# Patient Record
Sex: FEMALE | Race: WHITE | NOT HISPANIC OR LATINO
[De-identification: names, ages, dates, MRNs, and addresses within clinical notes are randomized per-mention and may not be internally consistent; named-entity substitution may affect disease eponyms.]

---

## 2019-08-13 ENCOUNTER — FORM ENCOUNTER (OUTPATIENT)
Age: 59
End: 2019-08-13

## 2020-03-16 ENCOUNTER — FORM ENCOUNTER (OUTPATIENT)
Age: 60
End: 2020-03-16

## 2020-08-18 ENCOUNTER — FORM ENCOUNTER (OUTPATIENT)
Age: 60
End: 2020-08-18

## 2021-05-12 DIAGNOSIS — Z85.3 PERSONAL HISTORY OF MALIGNANT NEOPLASM OF BREAST: ICD-10-CM

## 2021-05-12 PROBLEM — Z00.00 ENCOUNTER FOR PREVENTIVE HEALTH EXAMINATION: Status: ACTIVE | Noted: 2021-05-12

## 2021-08-26 DIAGNOSIS — Z86.79 PERSONAL HISTORY OF OTHER DISEASES OF THE CIRCULATORY SYSTEM: ICD-10-CM

## 2021-08-26 DIAGNOSIS — Z78.9 OTHER SPECIFIED HEALTH STATUS: ICD-10-CM

## 2021-08-26 DIAGNOSIS — Z80.0 FAMILY HISTORY OF MALIGNANT NEOPLASM OF DIGESTIVE ORGANS: ICD-10-CM

## 2021-08-26 RX ORDER — AMLODIPINE BESYLATE 5 MG/1
5 TABLET ORAL
Refills: 0 | Status: ACTIVE | COMMUNITY

## 2021-08-26 RX ORDER — ANASTROZOLE TABLETS 1 MG/1
1 TABLET ORAL
Refills: 0 | Status: ACTIVE | COMMUNITY

## 2021-09-01 ENCOUNTER — APPOINTMENT (OUTPATIENT)
Dept: BREAST CENTER | Facility: CLINIC | Age: 61
End: 2021-09-01
Payer: COMMERCIAL

## 2021-09-01 VITALS — BODY MASS INDEX: 21.16 KG/M2 | WEIGHT: 115 LBS | HEIGHT: 62 IN

## 2021-09-01 DIAGNOSIS — Z90.12 ACQUIRED ABSENCE OF LEFT BREAST AND NIPPLE: ICD-10-CM

## 2021-09-01 DIAGNOSIS — Z72.89 OTHER PROBLEMS RELATED TO LIFESTYLE: ICD-10-CM

## 2021-09-01 PROCEDURE — 99214 OFFICE O/P EST MOD 30 MIN: CPT

## 2021-09-01 NOTE — REASON FOR VISIT
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 Biopsy showed a well differentiated invasive duct cancer which was ER positive, OR negative, and HER-2/kelli negative and MRI showed a localized cancer and she underwent a left breast partial mastectomy and sentinel lymph node biopsy with intraoperative radiation as part of the TARGIT US trial in September 2015. Final pathology showed a 7 mm cancer with no lymphovascular invasion and 3 negative nodes making this a pathologic prognostic stage IA breast cancer. She did not require external beam radiation and was placed on Arimidex and comes in for routine follow-up.

## 2021-09-01 NOTE — ASSESSMENT
[FreeTextEntry1] : The patient is a 61-year-old G2, P2 postmenopausal white female of [place] descent. She underwent menarche at age 9 and had her first child at age 31. She underwent menopause at age 35 secondary to a hysterectomy but she did keep 1 ovary. She never took any hormone replacement therapy. She had bilateral stereotactic biopsies in the past which were benign and has no family history of breast or ovarian cancer. She was sent found to have an abnormal screening mammography and ultrasound with distortion in the left breast 12:00 region in July 2015 and ultrasound showed a suspicious 6 mm hypoechoic mass. Ultrasound-guided core biopsy performed on August 5, 2015 showed a well differentiated invasive duct cancer which was ER positive at 90%, MT negative, and HER-2/kelli negative. MRI showed localized disease and she underwent a left breast partial mastectomy and sentinel lymph node biopsy with intraoperative radiation as part of the TARGIT US trial on September 8, 2015. Final pathology showed a 7 mm well differentiated invasive duct cancer with no lymphovascular invasion and free margins and she had 3 negative sentinel lymph nodes making this a pathologic prognostic stage IA breast cancer. She did not require any external beam radiation and she saw Dr. Blackwell in consultation and was just placed on Arimidex. She underwent her last mammography and ultrasound on August 25, 2021 at [place] which showed no suspicious findings.  Her next mammography and ultrasound will be due again in August 2022.  On exam today, I cannot feel any suspicious densities in either breast with no evidence of recurrence.  She should remain on Arimidex and I had like to see her again in 6 months in February 2022.  She will start getting her routine studies here at [facility].

## 2021-09-01 NOTE — PHYSICAL EXAM
[Normocephalic] : normocephalic [Atraumatic] : atraumatic [EOMI] : extra ocular movement intact [Supple] : supple [No Supraclavicular Adenopathy] : no supraclavicular adenopathy [No Cervical Adenopathy] : no cervical adenopathy [Examined in the supine and seated position] : examined in the supine and seated position [No dominant masses] : no dominant masses in right breast  [No dominant masses] : no dominant masses left breast [No Nipple Retraction] : no left nipple retraction [No Nipple Discharge] : no left nipple discharge [Breast Mass Right Breast ___cm] : no masses [Breast Mass Left Breast ___cm] : no masses [Breast Nipple Inversion] : nipples not inverted [Breast Nipple Retraction] : nipples not retracted [Breast Nipple Flattening] : nipples not flattened [Breast Nipple Fissures] : nipples not fissured [Breast Abnormal Lactation (Galactorrhea)] : no galactorrhea [Breast Abnormal Secretion Bloody Fluid] : no bloody discharge [Breast Abnormal Secretion Serous Fluid] : no serous discharge [Breast Abnormal Secretion Opalescent Fluid] : no milky discharge [No Axillary Lymphadenopathy] : no left axillary lymphadenopathy [No Edema] : no edema [No Rashes] : no rashes [No Ulceration] : no ulceration [de-identified] : On exam, the patient has A-cup breasts.  On palpation she has a well-healed left breast periareolar excision with no evidence of recurrence in the left breast which was some scarring changes from the intraoperative radiation.  She has no axillary, supraclavicular, or cervical adenopathy. [de-identified] : Status post partial mastectomy with no evidence of recurrence and typical scarring changes after intraoperative radiation.

## 2021-09-01 NOTE — PAST MEDICAL HISTORY
[Postmenopausal] : The patient is postmenopausal [Menarche Age ____] : age at menarche was [unfilled] [Menopause Age____] : age at menopause was [unfilled] [Total Preg ___] : G[unfilled] [Live Births ___] : P[unfilled]  [Age At Live Birth ___] : Age at live birth: [unfilled] [History of Hormone Replacement Treatment] : has no history of hormone replacement treatment [de-identified] : s/p MAX and unilat SO

## 2021-09-01 NOTE — HISTORY OF PRESENT ILLNESS
[FreeTextEntry1] : The patient is a 61-year-old G2, P2 postmenopausal white female of English descent. She underwent menarche at age 9 and had her first child at age 31. She underwent menopause at age 35 secondary to a hysterectomy but she did keep 1 ovary. She never took any hormone replacement therapy. She had bilateral stereotactic biopsies in the past which were benign and has no family history of breast or ovarian cancer. She was sent found to have an abnormal screening mammography and ultrasound with distortion in the left breast 12:00 region in July 2015 and ultrasound showed a suspicious 6 mm hypoechoic mass. Ultrasound-guided core biopsy performed on August 5, 2015 showed a well differentiated invasive duct cancer which was ER positive at 90%, [place] negative, and HER-2/kelli negative. MRI showed localized disease and she underwent a left breast partial mastectomy and sentinel lymph node biopsy with intraoperative radiation as part of the TARGIT US trial on September 8, 2015. Final pathology showed a 7 mm well differentiated invasive duct cancer with no lymphovascular invasion and free margins and she had 3 negative sentinel lymph nodes making this a pathologic prognostic stage IA breast cancer. She did not require any external beam radiation and she saw Dr. Blackwell in consultation and was just placed on Arimidex. She comes in for routine follow-up and gets yearly mammography and ultrasound.

## 2021-10-13 RX ORDER — ANASTROZOLE TABLETS 1 MG/1
1 TABLET ORAL DAILY
Qty: 90 | Refills: 3 | Status: ACTIVE | COMMUNITY
Start: 2021-10-13 | End: 1900-01-01

## 2022-03-30 ENCOUNTER — NON-APPOINTMENT (OUTPATIENT)
Age: 62
End: 2022-03-30

## 2022-04-20 ENCOUNTER — APPOINTMENT (OUTPATIENT)
Dept: BREAST CENTER | Facility: CLINIC | Age: 62
End: 2022-04-20
Payer: COMMERCIAL

## 2022-04-20 VITALS
WEIGHT: 118 LBS | HEART RATE: 61 BPM | BODY MASS INDEX: 21.71 KG/M2 | DIASTOLIC BLOOD PRESSURE: 76 MMHG | SYSTOLIC BLOOD PRESSURE: 142 MMHG | HEIGHT: 62 IN

## 2022-04-20 DIAGNOSIS — R92.2 INCONCLUSIVE MAMMOGRAM: ICD-10-CM

## 2022-04-20 PROCEDURE — 99213 OFFICE O/P EST LOW 20 MIN: CPT

## 2022-04-20 NOTE — HISTORY OF PRESENT ILLNESS
[FreeTextEntry1] : The patient is a 62-year-old G2, P2 postmenopausal white female of [place] descent. She underwent menarche at age 9 and had her first child at age 31. She underwent menopause at age 35 secondary to a hysterectomy but she did keep 1 ovary. She never took any hormone replacement therapy. She had bilateral stereotactic biopsies in the past which were benign and has no family history of breast or ovarian cancer. She was sent found to have an abnormal screening mammography and ultrasound with distortion in the left breast 12:00 region in July 2015 and ultrasound showed a suspicious 6 mm hypoechoic mass. Ultrasound-guided core biopsy performed on August 5, 2015 showed a well differentiated invasive duct cancer which was ER positive at 90%, RI negative, and HER-2/kelli negative. MRI showed localized disease and she underwent a left breast partial mastectomy and sentinel lymph node biopsy with intraoperative radiation as part of the TARGIT US trial on September 8, 2015. Final pathology showed a 7 mm well differentiated invasive duct cancer with no lymphovascular invasion and free margins and she had 3 negative sentinel lymph nodes making this a pathologic prognostic stage IA breast cancer. She did not require any external beam radiation and she saw Dr. Blackwell in consultation and was just placed on Arimidex. She comes in for routine follow-up and gets yearly mammography and ultrasound.

## 2022-04-20 NOTE — REASON FOR VISIT
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 Biopsy showed a well differentiated invasive duct cancer which was ER positive, [place] negative, and HER-2/kelli negative and MRI showed a localized cancer and she underwent a left breast partial mastectomy and sentinel lymph node biopsy with intraoperative radiation as part of the TARGIT US trial in September 2015. Final pathology showed a 7 mm cancer with no lymphovascular invasion and 3 negative nodes making this a pathologic prognostic stage IA breast cancer. She did not require external beam radiation and was placed on Arimidex and comes in for routine follow-up.

## 2022-04-20 NOTE — PHYSICAL EXAM
[Normocephalic] : normocephalic [Atraumatic] : atraumatic [EOMI] : extra ocular movement intact [Supple] : supple [No Supraclavicular Adenopathy] : no supraclavicular adenopathy [No Cervical Adenopathy] : no cervical adenopathy [Examined in the supine and seated position] : examined in the supine and seated position [No dominant masses] : no dominant masses in right breast  [No dominant masses] : no dominant masses left breast [No Nipple Retraction] : no left nipple retraction [No Nipple Discharge] : no left nipple discharge [Breast Mass Right Breast ___cm] : no masses [Breast Mass Left Breast ___cm] : no masses [No Axillary Lymphadenopathy] : no left axillary lymphadenopathy [No Edema] : no edema [No Rashes] : no rashes [No Ulceration] : no ulceration [Breast Nipple Inversion] : nipples not inverted [Breast Nipple Retraction] : nipples not retracted [Breast Nipple Flattening] : nipples not flattened [Breast Nipple Fissures] : nipples not fissured [Breast Abnormal Lactation (Galactorrhea)] : no galactorrhea [Breast Abnormal Secretion Bloody Fluid] : no bloody discharge [Breast Abnormal Secretion Serous Fluid] : no serous discharge [Breast Abnormal Secretion Opalescent Fluid] : no milky discharge [de-identified] : On exam, the patient has A-cup breasts.  On palpation she has a well-healed left breast periareolar excision with no evidence of recurrence in the left breast which was some scarring changes from the intraoperative radiation.  She has no axillary, supraclavicular, or cervical adenopathy. [de-identified] : Status post partial mastectomy with no evidence of recurrence and typical scarring changes after intraoperative radiation.

## 2022-04-20 NOTE — ASSESSMENT
[FreeTextEntry1] : The patient is a 62-year-old G2, P2 postmenopausal white female of [place] descent. She underwent menarche at age 9 and had her first child at age 31. She underwent menopause at age 35 secondary to a hysterectomy but she did keep 1 ovary. She never took any hormone replacement therapy. She had bilateral stereotactic biopsies in the past which were benign and has no family history of breast or ovarian cancer. She was sent found to have an abnormal screening mammography and ultrasound with distortion in the left breast 12:00 region in July 2015 and ultrasound showed a suspicious 6 mm hypoechoic mass. Ultrasound-guided core biopsy performed on August 5, 2015 showed a well differentiated invasive duct cancer which was ER positive at 90%, MT negative, and HER-2/kelli negative. MRI showed localized disease and she underwent a left breast partial mastectomy and sentinel lymph node biopsy with intraoperative radiation as part of the TARGIT US trial on September 8, 2015. Final pathology showed a 7 mm well differentiated invasive duct cancer with no lymphovascular invasion and free margins and she had 3 negative sentinel lymph nodes making this a pathologic prognostic stage IA breast cancer. She did not require any external beam radiation and she saw Dr. Blackwell in consultation and was just placed on Arimidex. She underwent her last mammography and ultrasound on August 25, 2021 at [place] which showed no suspicious findings.  Her next mammography and ultrasound will be due again in August 2022 and she was given prescriptions.  On exam today, I cannot feel any suspicious densities in either breast with no evidence of recurrence.  She should remain on Arimidex and I would like to see her again in 1 year in April 2023.  She will start getting her routine studies here at [facility].

## 2022-04-20 NOTE — PAST MEDICAL HISTORY
[Postmenopausal] : The patient is postmenopausal [Menarche Age ____] : age at menarche was [unfilled] [Menopause Age____] : age at menopause was [unfilled] [Total Preg ___] : G[unfilled] [Live Births ___] : P[unfilled]  [Age At Live Birth ___] : Age at live birth: [unfilled] [History of Hormone Replacement Treatment] : has no history of hormone replacement treatment [de-identified] : s/p MAX and unilat SO

## 2022-08-30 ENCOUNTER — RESULT REVIEW (OUTPATIENT)
Age: 62
End: 2022-08-30

## 2023-04-18 ENCOUNTER — APPOINTMENT (OUTPATIENT)
Dept: BREAST CENTER | Facility: CLINIC | Age: 63
End: 2023-04-18
Payer: COMMERCIAL

## 2023-04-18 VITALS
OXYGEN SATURATION: 100 % | BODY MASS INDEX: 21.53 KG/M2 | DIASTOLIC BLOOD PRESSURE: 79 MMHG | HEART RATE: 64 BPM | WEIGHT: 117 LBS | SYSTOLIC BLOOD PRESSURE: 153 MMHG | HEIGHT: 62 IN

## 2023-04-18 PROCEDURE — 99213 OFFICE O/P EST LOW 20 MIN: CPT

## 2023-04-18 NOTE — ADDENDUM
[FreeTextEntry1] : The patient was reconsented to the TARGIT US trial (IRB#) today and met all eligibility inclusion criteria and did not meet any exclusion criteria.  The patient was presented with the opportunity to continue on the TARGIT US trial following acceptance by signing the TARGIT [facility] consent.  During the discussion, the subject was presented with the fact that the study involves research and they understood the study schedule and procedures involved.  The main risks of the study and the fact that all risks may not be known at this time was also discussed. The subject understood that new information which may affect the subjects willingness to continue on the study will be presented to the patient as soon as it is available.  All benefits and alternatives of participating in this trial were explained to the patient.  Confidentiality contacts for questions about the study and the patient's rights while on the study were also discussed.  The patient understands the fact that the subjects participation is voluntary and they can refuse or withdraw at any time without penalty or loss of benefits.  The patient was given ample time to ask questions prior to signing and all the patient's questions were answered to the patient's satisfaction.  The subject, investigator, and witness all signed the informed consent documents and a copy of the signed consent form was given to the patient.  Informed consent was obtained prior to any study procedures performed.  The clinical research coordinator contact information was also provided to the subject for any questions in the future.\par

## 2023-04-18 NOTE — ASSESSMENT
[FreeTextEntry1] : The patient is a 63-year-old G2, P2 postmenopausal white female of [place] descent. She underwent menarche at age 9 and had her first child at age 31. She underwent menopause at age 35 secondary to a hysterectomy but she did keep 1 ovary. She never took any hormone replacement therapy. She had bilateral stereotactic biopsies in the past which were benign and has no family history of breast or ovarian cancer. She was sent found to have an abnormal screening mammography and ultrasound with distortion in the left breast 12:00 region in July 2015 and ultrasound showed a suspicious 6 mm hypoechoic mass. Ultrasound-guided core biopsy performed on August 5, 2015 showed a well differentiated invasive duct cancer which was ER positive at 90%, [place] negative, and HER-2/kelli negative. MRI showed localized disease and she underwent a left breast partial mastectomy and sentinel lymph node biopsy with intraoperative radiation as part of the TARGIT US trial on September 8, 2015. Final pathology showed a 7 mm well differentiated invasive duct cancer with no lymphovascular invasion and free margins and she had 3 negative sentinel lymph nodes making this a pathologic prognostic stage IA breast cancer. She did not require any external beam radiation and she saw Dr. Blackwell in consultation and was just placed on Arimidex. She underwent her last mammography and ultrasound which was reviewed from August 30, 2022 and performed at [place] which showed no suspicious findings.  Her next mammography and ultrasound will be due again in August 2023 and she was given prescriptions.  On exam today, I cannot feel any suspicious densities in either breast with no evidence of recurrence.  She should remain on Arimidex for the full 10 years and I would like to see her again in 1 year in April 2024.  I did we consent her into the TARGIT US trial in the office today through [facility].

## 2023-04-18 NOTE — HISTORY OF PRESENT ILLNESS
[FreeTextEntry1] : The patient is a 63-year-old G2, P2 postmenopausal white female of [place] descent. She underwent menarche at age 9 and had her first child at age 31. She underwent menopause at age 35 secondary to a hysterectomy but she did keep 1 ovary. She never took any hormone replacement therapy. She had bilateral stereotactic biopsies in the past which were benign and has no family history of breast or ovarian cancer. She was sent found to have an abnormal screening mammography and ultrasound with distortion in the left breast 12:00 region in July 2015 and ultrasound showed a suspicious 6 mm hypoechoic mass. Ultrasound-guided core biopsy performed on August 5, 2015 showed a well differentiated invasive duct cancer which was ER positive at 90%, CT negative, and HER-2/kelli negative. MRI showed localized disease and she underwent a left breast partial mastectomy and sentinel lymph node biopsy with intraoperative radiation as part of the TARGIT US trial on September 8, 2015. Final pathology showed a 7 mm well differentiated invasive duct cancer with no lymphovascular invasion and free margins and she had 3 negative sentinel lymph nodes making this a pathologic prognostic stage IA breast cancer. She did not require any external beam radiation and she saw Dr. Blackwell in consultation and was just placed on Arimidex. She comes in for routine follow-up and gets yearly mammography and ultrasound.

## 2023-04-18 NOTE — PHYSICAL EXAM
[Normocephalic] : normocephalic [Atraumatic] : atraumatic [EOMI] : extra ocular movement intact [Supple] : supple [No Supraclavicular Adenopathy] : no supraclavicular adenopathy [No Cervical Adenopathy] : no cervical adenopathy [Examined in the supine and seated position] : examined in the supine and seated position [No dominant masses] : no dominant masses in right breast  [No dominant masses] : no dominant masses left breast [No Nipple Retraction] : no left nipple retraction [No Nipple Discharge] : no left nipple discharge [Breast Mass Right Breast ___cm] : no masses [Breast Mass Left Breast ___cm] : no masses [No Axillary Lymphadenopathy] : no left axillary lymphadenopathy [No Edema] : no edema [No Rashes] : no rashes [No Ulceration] : no ulceration [Breast Nipple Inversion] : nipples not inverted [Breast Nipple Retraction] : nipples not retracted [Breast Nipple Flattening] : nipples not flattened [Breast Nipple Fissures] : nipples not fissured [Breast Abnormal Lactation (Galactorrhea)] : no galactorrhea [Breast Abnormal Secretion Bloody Fluid] : no bloody discharge [Breast Abnormal Secretion Serous Fluid] : no serous discharge [Breast Abnormal Secretion Opalescent Fluid] : no milky discharge [de-identified] : On exam, the patient has A-cup breasts.  On palpation she has a well-healed left breast periareolar excision with no evidence of recurrence in the left breast which was some scarring changes from the intraoperative radiation.  She has no axillary, supraclavicular, or cervical adenopathy. [de-identified] : Status post partial mastectomy with no evidence of recurrence and typical scarring changes after intraoperative radiation.

## 2023-04-18 NOTE — PAST MEDICAL HISTORY
[Postmenopausal] : The patient is postmenopausal [Menarche Age ____] : age at menarche was [unfilled] [Menopause Age____] : age at menopause was [unfilled] [Total Preg ___] : G[unfilled] [Live Births ___] : P[unfilled]  [Age At Live Birth ___] : Age at live birth: [unfilled] [History of Hormone Replacement Treatment] : has no history of hormone replacement treatment [de-identified] : s/p MAX and unilat SO

## 2023-04-18 NOTE — REASON FOR VISIT
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 Biopsy showed a well differentiated invasive duct cancer which was ER positive, [place] negative, and HER-2/kelli negative and MRI showed a localized cancer and she underwent a left breast partial mastectomy and sentinel lymph node biopsy with intraoperative radiation as part of the TARGIT US trial in September 2015. Final pathology showed a 7 mm cancer with no lymphovascular invasion and 3 negative nodes making this a pathologic prognostic stage IA breast cancer. She did not require external beam radiation and was placed on Arimidex and comes in for routine follow-up.

## 2023-09-06 ENCOUNTER — RESULT REVIEW (OUTPATIENT)
Age: 63
End: 2023-09-06

## 2024-03-26 ENCOUNTER — NON-APPOINTMENT (OUTPATIENT)
Age: 64
End: 2024-03-26

## 2024-04-09 NOTE — REASON FOR VISIT
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 Biopsy showed a well differentiated invasive duct cancer which was ER positive, [place] negative, and HER-2/kelli negative and MRI showed a localized cancer and she underwent a left breast partial mastectomy and sentinel lymph node biopsy with intraoperative radiation as part of the TARGIT US trial in September 2015. Final pathology showed a 7 mm cancer with no lymphovascular invasion and 3 negative nodes making this a pathologic prognostic stage IA breast cancer. She did not require external beam radiation and was placed on Arimidex and comes in for routine follow-up.

## 2024-04-09 NOTE — ASSESSMENT
[FreeTextEntry1] : The patient is a 64-year-old G2, P2 postmenopausal white female of [place] descent. She underwent menarche at age 9 and had her first child at age 31. She underwent menopause at age 35 secondary to a hysterectomy but she did keep 1 ovary. She never took any hormone replacement therapy. She had bilateral stereotactic biopsies in the past which were benign and has no family history of breast or ovarian cancer. She was sent found to have an abnormal screening mammography and ultrasound with distortion in the left breast 12:00 region in July 2015 and ultrasound showed a suspicious 6 mm hypoechoic mass. Ultrasound-guided core biopsy performed on August 5, 2015 showed a well differentiated invasive duct cancer which was ER positive at 90%, [place] negative, and HER-2/kelli negative. MRI showed localized disease and she underwent a left breast partial mastectomy and sentinel lymph node biopsy with intraoperative radiation as part of the TARGIT US trial on September 8, 2015. Final pathology showed a 7 mm well differentiated invasive duct cancer with no lymphovascular invasion and free margins and she had 3 negative sentinel lymph nodes making this a pathologic prognostic stage IA breast cancer. She did not require any external beam radiation and she saw Dr. Blackwell in consultation and was just placed on Arimidex. She underwent her last mammography and ultrasound which was reviewed from September 6, 2023 and performed at [place] which showed no suspicious findings with stable postop changes in the left breast.  Her next mammography and ultrasound will be due again in September 2024 and she was given prescriptions.  On exam today, I cannot feel any suspicious densities in either breast with no evidence of recurrence.  She should remain on Arimidex for the full 10 years and I would like to see her again in 1 year in April 2025.  She remains in the TARGIT US trial through [facility].

## 2024-04-09 NOTE — PAST MEDICAL HISTORY
[Postmenopausal] : The patient is postmenopausal [Menarche Age ____] : age at menarche was [unfilled] [Menopause Age____] : age at menopause was [unfilled] [Total Preg ___] : G[unfilled] [Live Births ___] : P[unfilled]  [Age At Live Birth ___] : Age at live birth: [unfilled] [History of Hormone Replacement Treatment] : has no history of hormone replacement treatment [de-identified] : s/p MAX and unilat SO

## 2024-04-09 NOTE — HISTORY OF PRESENT ILLNESS
[FreeTextEntry1] : The patient is a 64-year-old G2, P2 postmenopausal white female of [place] descent. She underwent menarche at age 9 and had her first child at age 31. She underwent menopause at age 35 secondary to a hysterectomy but she did keep 1 ovary. She never took any hormone replacement therapy. She had bilateral stereotactic biopsies in the past which were benign and has no family history of breast or ovarian cancer. She was sent found to have an abnormal screening mammography and ultrasound with distortion in the left breast 12:00 region in July 2015 and ultrasound showed a suspicious 6 mm hypoechoic mass. Ultrasound-guided core biopsy performed on August 5, 2015 showed a well differentiated invasive duct cancer which was ER positive at 90%, [place] negative, and HER-2/kelli negative. MRI showed localized disease and she underwent a left breast partial mastectomy and sentinel lymph node biopsy with intraoperative radiation as part of the TARGIT US trial on September 8, 2015. Final pathology showed a 7 mm well differentiated invasive duct cancer with no lymphovascular invasion and free margins and she had 3 negative sentinel lymph nodes making this a pathologic prognostic stage IA breast cancer. She did not require any external beam radiation and she saw Dr. Blackwell in consultation and was just placed on Arimidex. She comes in for routine follow-up and gets yearly mammography and ultrasound.

## 2024-04-09 NOTE — ADDENDUM
[FreeTextEntry1] : The patient was reconsented to the TARGIT US trial (IRB#) today and met all eligibility inclusion criteria and did not meet any exclusion criteria.  The patient was presented with the opportunity to continue on the TARGIT US trial following acceptance by signing the TARGIT [facility] consent.  During the discussion, the subject was presented with the fact that the study involves research and they understood the study schedule and procedures involved.  The main risks of the study and the fact that all risks may not be known at this time was also discussed. The subject understood that new information which may affect the subjects willingness to continue on the study will be presented to the patient as soon as it is available.  All benefits and alternatives of participating in this trial were explained to the patient.  Confidentiality contacts for questions about the study and the patient's rights while on the study were also discussed.  The patient understands the fact that the subjects participation is voluntary and they can refuse or withdraw at any time without penalty or loss of benefits.  The patient was given ample time to ask questions prior to signing and all the patient's questions were answered to the patient's satisfaction.  The subject, investigator, and witness all signed the informed consent documents and a copy of the signed consent form was given to the patient.  Informed consent was obtained prior to any study procedures performed.  The clinical research coordinator contact information was also provided to the subject for any questions in the future.\par

## 2024-04-09 NOTE — PHYSICAL EXAM
[Normocephalic] : normocephalic [EOMI] : extra ocular movement intact [Atraumatic] : atraumatic [Supple] : supple [No Supraclavicular Adenopathy] : no supraclavicular adenopathy [Examined in the supine and seated position] : examined in the supine and seated position [No Cervical Adenopathy] : no cervical adenopathy [No dominant masses] : no dominant masses in right breast  [No dominant masses] : no dominant masses left breast [No Nipple Retraction] : no left nipple retraction [No Nipple Discharge] : no left nipple discharge [Breast Mass Right Breast ___cm] : no masses [Breast Mass Left Breast ___cm] : no masses [No Axillary Lymphadenopathy] : no left axillary lymphadenopathy [No Edema] : no edema [No Rashes] : no rashes [No Ulceration] : no ulceration [Breast Nipple Retraction] : nipples not retracted [Breast Nipple Inversion] : nipples not inverted [Breast Nipple Flattening] : nipples not flattened [Breast Nipple Fissures] : nipples not fissured [Breast Abnormal Lactation (Galactorrhea)] : no galactorrhea [Breast Abnormal Secretion Bloody Fluid] : no bloody discharge [Breast Abnormal Secretion Serous Fluid] : no serous discharge [Breast Abnormal Secretion Opalescent Fluid] : no milky discharge [de-identified] : On exam, the patient has A-cup breasts.  On palpation she has a well-healed left breast periareolar excision with no evidence of recurrence in the left breast which was some scarring changes from the intraoperative radiation.  She has no axillary, supraclavicular, or cervical adenopathy. [de-identified] : Status post partial mastectomy with no evidence of recurrence and typical scarring changes after intraoperative radiation.

## 2024-04-09 NOTE — ADDENDUM
[FreeTextEntry1] : The patient was reconsented to the TARGIT US trial (IRB#) today and met all eligibility inclusion criteria and did not meet any exclusion criteria.  The patient was presented with the opportunity to continue on the TARGIT US trial following acceptance by signing the TARGIT [facility] consent.  During the discussion, the subject was presented with the fact that the study involves research and they understood the study schedule and procedures involved.  The main risks of the study and the fact that all risks may not be known at this time was also discussed. The subject understood that new information which may affect the subjects willingness to continue on the study will be presented to the patient as soon as it is available.  All benefits and alternatives of participating in this trial were explained to the patient.  Confidentiality contacts for questions about the study and the patient's rights while on the study were also discussed.  The patient understands the fact that the subjects participation is voluntary and they can refuse or withdraw at any time without penalty or loss of benefits.  The patient was given ample time to ask questions prior to signing and all the patient's questions were answered to the patient's satisfaction.  The subject, investigator, and witness all signed the informed consent documents and a copy of the signed consent form was given to the patient.  Informed consent was obtained prior to any study procedures performed.  The clinical research coordinator contact information was also provided to the subject for any questions in the future.\par

## 2024-04-16 ENCOUNTER — APPOINTMENT (OUTPATIENT)
Dept: BREAST CENTER | Facility: CLINIC | Age: 64
End: 2024-04-16
Payer: COMMERCIAL

## 2024-04-16 VITALS — HEIGHT: 62 IN | BODY MASS INDEX: 22.08 KG/M2 | OXYGEN SATURATION: 100 % | HEART RATE: 65 BPM | WEIGHT: 120 LBS

## 2024-04-16 DIAGNOSIS — N60.11 DIFFUSE CYSTIC MASTOPATHY OF LEFT BREAST: ICD-10-CM

## 2024-04-16 DIAGNOSIS — N60.12 DIFFUSE CYSTIC MASTOPATHY OF LEFT BREAST: ICD-10-CM

## 2024-04-16 DIAGNOSIS — Z85.3 PERSONAL HISTORY OF MALIGNANT NEOPLASM OF BREAST: ICD-10-CM

## 2024-04-16 DIAGNOSIS — Z90.12 ACQUIRED ABSENCE OF LEFT BREAST AND NIPPLE: ICD-10-CM

## 2024-04-16 PROCEDURE — 99212 OFFICE O/P EST SF 10 MIN: CPT

## 2024-04-16 NOTE — REASON FOR VISIT
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 Biopsy showed a well differentiated invasive duct cancer which was ER positive, KS negative, and HER-2/kelli negative and MRI showed a localized cancer and she underwent a left breast partial mastectomy and sentinel lymph node biopsy with intraoperative radiation as part of the TARGIT US trial in September 2015. Final pathology showed a 7 mm cancer with no lymphovascular invasion and 3 negative nodes making this a pathologic prognostic stage IA breast cancer. She did not require external beam radiation and was placed on Arimidex and comes in for routine follow-up.

## 2024-04-16 NOTE — PHYSICAL EXAM
[Normocephalic] : normocephalic [Atraumatic] : atraumatic [EOMI] : extra ocular movement intact [Supple] : supple [No Supraclavicular Adenopathy] : no supraclavicular adenopathy [No Cervical Adenopathy] : no cervical adenopathy [Examined in the supine and seated position] : examined in the supine and seated position [No dominant masses] : no dominant masses in right breast  [No dominant masses] : no dominant masses left breast [No Nipple Retraction] : no left nipple retraction [No Nipple Discharge] : no left nipple discharge [Breast Mass Right Breast ___cm] : no masses [Breast Mass Left Breast ___cm] : no masses [No Axillary Lymphadenopathy] : no left axillary lymphadenopathy [No Edema] : no edema [No Rashes] : no rashes [No Ulceration] : no ulceration [Breast Nipple Inversion] : nipples not inverted [Breast Nipple Retraction] : nipples not retracted [Breast Nipple Flattening] : nipples not flattened [Breast Nipple Fissures] : nipples not fissured [Breast Abnormal Lactation (Galactorrhea)] : no galactorrhea [Breast Abnormal Secretion Bloody Fluid] : no bloody discharge [Breast Abnormal Secretion Serous Fluid] : no serous discharge [Breast Abnormal Secretion Opalescent Fluid] : no milky discharge [de-identified] : On exam, the patient has A-cup breasts.  On palpation she has a well-healed left breast periareolar excision with no evidence of recurrence in the left breast which was some scarring changes from the intraoperative radiation.  She has no axillary, supraclavicular, or cervical adenopathy. [de-identified] : Status post partial mastectomy with no evidence of recurrence and typical scarring changes after intraoperative radiation.

## 2024-04-16 NOTE — ASSESSMENT
[FreeTextEntry1] : The patient is a 64-year-old G2, P2 postmenopausal white female of [place] descent. She underwent menarche at age 9 and had her first child at age 31. She underwent menopause at age 35 secondary to a hysterectomy but she did keep 1 ovary. She never took any hormone replacement therapy. She had bilateral stereotactic biopsies in the past which were benign and has no family history of breast or ovarian cancer. She was sent found to have an abnormal screening mammography and ultrasound with distortion in the left breast 12:00 region in July 2015 and ultrasound showed a suspicious 6 mm hypoechoic mass. Ultrasound-guided core biopsy performed on August 5, 2015 showed a well differentiated invasive duct cancer which was ER positive at 90%, KS negative, and HER-2/kelli negative. MRI showed localized disease and she underwent a left breast partial mastectomy and sentinel lymph node biopsy with intraoperative radiation as part of the TARGIT US trial on September 8, 2015. Final pathology showed a 7 mm well differentiated invasive duct cancer with no lymphovascular invasion and free margins and she had 3 negative sentinel lymph nodes making this a pathologic prognostic stage IA breast cancer. She did not require any external beam radiation and she saw Dr. Blackwell in consultation and was just placed on Arimidex. She underwent her last mammography and ultrasound which was reviewed from September 6, 2023 and performed at [place] which showed no suspicious findings with stable postop changes in the left breast.  Her next mammography and ultrasound will be due again in September 2024 and she was given prescriptions.  On exam today, I cannot feel any suspicious densities in either breast with no evidence of recurrence.  She should remain on Arimidex for the full 10 years and I would like to see her again in 1 year in April 2025.  She remains in the TARGIT US trial through [facility].

## 2024-04-16 NOTE — HISTORY OF PRESENT ILLNESS
[FreeTextEntry1] : The patient is a 64-year-old G2, P2 postmenopausal white female of [place] descent. She underwent menarche at age 9 and had her first child at age 31. She underwent menopause at age 35 secondary to a hysterectomy but she did keep 1 ovary. She never took any hormone replacement therapy. She had bilateral stereotactic biopsies in the past which were benign and has no family history of breast or ovarian cancer. She was sent found to have an abnormal screening mammography and ultrasound with distortion in the left breast 12:00 region in July 2015 and ultrasound showed a suspicious 6 mm hypoechoic mass. Ultrasound-guided core biopsy performed on August 5, 2015 showed a well differentiated invasive duct cancer which was ER positive at 90%, NV negative, and HER-2/kelli negative. MRI showed localized disease and she underwent a left breast partial mastectomy and sentinel lymph node biopsy with intraoperative radiation as part of the TARGIT US trial on September 8, 2015. Final pathology showed a 7 mm well differentiated invasive duct cancer with no lymphovascular invasion and free margins and she had 3 negative sentinel lymph nodes making this a pathologic prognostic stage IA breast cancer. She did not require any external beam radiation and she saw Dr. Blackwell in consultation and was just placed on Arimidex. She comes in for routine follow-up and gets yearly mammography and ultrasound.

## 2024-04-16 NOTE — PAST MEDICAL HISTORY
[Postmenopausal] : The patient is postmenopausal [Menarche Age ____] : age at menarche was [unfilled] [Menopause Age____] : age at menopause was [unfilled] [Total Preg ___] : G[unfilled] [Live Births ___] : P[unfilled]  [Age At Live Birth ___] : Age at live birth: [unfilled] [History of Hormone Replacement Treatment] : has no history of hormone replacement treatment [de-identified] : s/p MAX and unilat SO

## 2024-06-19 ENCOUNTER — NON-APPOINTMENT (OUTPATIENT)
Age: 64
End: 2024-06-19

## 2024-06-19 RX ORDER — ANASTROZOLE TABLETS 1 MG/1
1 TABLET ORAL DAILY
Qty: 90 | Refills: 3 | Status: ACTIVE | COMMUNITY
Start: 2024-06-19 | End: 1900-01-01

## 2024-09-10 ENCOUNTER — RESULT REVIEW (OUTPATIENT)
Age: 64
End: 2024-09-10

## 2025-03-11 ENCOUNTER — NON-APPOINTMENT (OUTPATIENT)
Age: 65
End: 2025-03-11

## 2025-04-14 ENCOUNTER — NON-APPOINTMENT (OUTPATIENT)
Age: 65
End: 2025-04-14

## 2025-04-14 DIAGNOSIS — Z12.31 ENCOUNTER FOR SCREENING MAMMOGRAM FOR MALIGNANT NEOPLASM OF BREAST: ICD-10-CM

## 2025-04-15 ENCOUNTER — APPOINTMENT (OUTPATIENT)
Dept: BREAST CENTER | Facility: CLINIC | Age: 65
End: 2025-04-15
Payer: MEDICARE

## 2025-04-15 VITALS
HEART RATE: 66 BPM | HEIGHT: 62.5 IN | SYSTOLIC BLOOD PRESSURE: 143 MMHG | WEIGHT: 118 LBS | DIASTOLIC BLOOD PRESSURE: 75 MMHG | BODY MASS INDEX: 21.17 KG/M2 | OXYGEN SATURATION: 98 %

## 2025-04-15 DIAGNOSIS — Z12.39 ENCOUNTER FOR OTHER SCREENING FOR MALIGNANT NEOPLASM OF BREAST: ICD-10-CM

## 2025-04-15 DIAGNOSIS — Z85.3 PERSONAL HISTORY OF MALIGNANT NEOPLASM OF BREAST: ICD-10-CM

## 2025-04-15 DIAGNOSIS — N60.12 DIFFUSE CYSTIC MASTOPATHY OF LEFT BREAST: ICD-10-CM

## 2025-04-15 DIAGNOSIS — Z90.12 ACQUIRED ABSENCE OF LEFT BREAST AND NIPPLE: ICD-10-CM

## 2025-04-15 DIAGNOSIS — N60.11 DIFFUSE CYSTIC MASTOPATHY OF LEFT BREAST: ICD-10-CM

## 2025-04-15 PROCEDURE — 99203 OFFICE O/P NEW LOW 30 MIN: CPT

## 2025-08-28 ENCOUNTER — NON-APPOINTMENT (OUTPATIENT)
Age: 65
End: 2025-08-28

## 2025-09-11 ENCOUNTER — RESULT REVIEW (OUTPATIENT)
Age: 65
End: 2025-09-11